# Patient Record
Sex: MALE | Race: WHITE | NOT HISPANIC OR LATINO | URBAN - METROPOLITAN AREA
[De-identification: names, ages, dates, MRNs, and addresses within clinical notes are randomized per-mention and may not be internally consistent; named-entity substitution may affect disease eponyms.]

---

## 2022-01-01 ENCOUNTER — EMERGENCY (EMERGENCY)
Facility: HOSPITAL | Age: 37
LOS: 1 days | End: 2022-01-01
Attending: EMERGENCY MEDICINE | Admitting: EMERGENCY MEDICINE

## 2022-01-01 DIAGNOSIS — I46.9 CARDIAC ARREST, CAUSE UNSPECIFIED: ICD-10-CM

## 2022-01-01 DIAGNOSIS — Z86.59 PERSONAL HISTORY OF OTHER MENTAL AND BEHAVIORAL DISORDERS: ICD-10-CM

## 2022-01-01 DIAGNOSIS — Z59.00 HOMELESSNESS UNSPECIFIED: ICD-10-CM

## 2022-01-01 DIAGNOSIS — Z20.822 CONTACT WITH AND (SUSPECTED) EXPOSURE TO COVID-19: ICD-10-CM

## 2022-01-01 LAB
ALBUMIN SERPL ELPH-MCNC: 2.1 G/DL — LOW (ref 3.4–5)
ALP SERPL-CCNC: 65 U/L — SIGNIFICANT CHANGE UP (ref 40–120)
ALT FLD-CCNC: >1000 U/L — HIGH (ref 12–42)
ANION GAP SERPL CALC-SCNC: 20 MMOL/L — HIGH (ref 9–16)
AST SERPL-CCNC: 968 U/L — HIGH (ref 15–37)
BILIRUB SERPL-MCNC: 0.3 MG/DL — SIGNIFICANT CHANGE UP (ref 0.2–1.2)
BUN SERPL-MCNC: 16 MG/DL — SIGNIFICANT CHANGE UP (ref 7–23)
CALCIUM SERPL-MCNC: 7.3 MG/DL — LOW (ref 8.5–10.5)
CHLORIDE SERPL-SCNC: 97 MMOL/L — SIGNIFICANT CHANGE UP (ref 96–108)
CO2 SERPL-SCNC: 23 MMOL/L — SIGNIFICANT CHANGE UP (ref 22–31)
CREAT SERPL-MCNC: 2.14 MG/DL — HIGH (ref 0.5–1.3)
EGFR: 40 ML/MIN/1.73M2 — LOW
GLUCOSE SERPL-MCNC: >750 MG/DL — CRITICAL HIGH (ref 70–99)
LACTATE SERPL-SCNC: >15 MMOL/L — CRITICAL HIGH (ref 0.4–2)
MAGNESIUM SERPL-MCNC: 2.2 MG/DL — SIGNIFICANT CHANGE UP (ref 1.6–2.6)
NT-PROBNP SERPL-SCNC: 13 PG/ML — SIGNIFICANT CHANGE UP
PHOSPHATE SERPL-MCNC: 8.4 MG/DL — HIGH (ref 2.5–4.5)
POTASSIUM SERPL-MCNC: 6.5 MMOL/L — CRITICAL HIGH (ref 3.5–5.3)
POTASSIUM SERPL-SCNC: 6.5 MMOL/L — CRITICAL HIGH (ref 3.5–5.3)
PROT SERPL-MCNC: 4.7 G/DL — LOW (ref 6.4–8.2)
SARS-COV-2 RNA SPEC QL NAA+PROBE: SIGNIFICANT CHANGE UP
SODIUM SERPL-SCNC: 140 MMOL/L — SIGNIFICANT CHANGE UP (ref 132–145)
TROPONIN I, HIGH SENSITIVITY RESULT: 23.1 NG/L — SIGNIFICANT CHANGE UP

## 2022-01-01 PROCEDURE — 99284 EMERGENCY DEPT VISIT MOD MDM: CPT

## 2022-01-01 SDOH — ECONOMIC STABILITY - HOUSING INSECURITY: HOMELESSNESS UNSPECIFIED: Z59.00

## 2022-10-20 NOTE — ED PROVIDER NOTE - CLINICAL SUMMARY MEDICAL DECISION MAKING FREE TEXT BOX
ACLS continued at handoff from EMS, given several rounds of IO/IV epi and bicarb w/quality compressions throughout via TRENT machine. PEA during each pulse check, cardiac standstill visualized on POCUS 2x. Care withdrawn with time of death 6:49PM.

## 2022-10-20 NOTE — ED ADULT NURSE NOTE - OBJECTIVE STATEMENT
Pt BIBEMS in cardiac arrest. Pt found down in restaurant bathroom by bystander unknown down time . CPR in progress upon arrival, pt given epi x5 PTA. Pt arrives in PEA, pulseless, intubated by EMS. 7.0 tube 28 at the lip. Respirations equal, clear b/l to BVM. Initial FS 51- D50 given IVP. CPR maintained in ED, pt remained in PEA throughout efforts.

## 2022-10-20 NOTE — ED PROVIDER NOTE - OBJECTIVE STATEMENT
37 reportedly homeless M BIBEMS in caridac arrest, found down in a restaurant bathroom with drug paraphernalia at around 5:50PM in cardiac arrest after having been seen entering bathroom around 5:30PM per EMS. EMS arrived around 6:10 to chest compressions in progress by civilians, intubated in the field and given a total of 5 rounds of epi with each pulse check showing PEA. No trauma visible on scene. Suspicion from EMS was an opioid overdose.

## 2022-10-20 NOTE — ED ADULT TRIAGE NOTE - CHIEF COMPLAINT QUOTE
notification by EMS cardiac arrest; found down in bathroom by bystander; CPR in progress; intubated on scene PTA

## 2022-10-20 NOTE — ED PROVIDER NOTE - PROGRESS NOTE DETAILS
Pt's cousin Juan José notified of death.  Juan José wants to break news to pt's father.  States pt had hx of etoh and drug abuse.    Juan José (cousin) [phone]  Jose Francisco (father) [phone] [place] case # [id number]
